# Patient Record
Sex: FEMALE | Race: WHITE | Employment: FULL TIME
[De-identification: names, ages, dates, MRNs, and addresses within clinical notes are randomized per-mention and may not be internally consistent; named-entity substitution may affect disease eponyms.]

---

## 2024-10-06 NOTE — ASSESSMENT & PLAN NOTE
Physical activity for a total of 150 minutes per week is recommended, drink plenty of water.  Reduce CHO intake, such as white pastas, white rice, white breads. Avoid fried foods, and eat more green, leafy vegetables, whole grains, and lean proteins.  Discussed recommended screenings and vaccines, advised to complete fasting labs in December, advised to complete fasting labs, will send results via [employer] but agrees to follow up if abnormal

## 2024-10-06 NOTE — PROGRESS NOTES
Katheryn Taylor is a 29 y.o. female is seen on 10/7/2024 for Annual Exam, Hypothyroidism, and Inattention    Assessment & Plan:     1. Annual physical exam  Assessment & Plan:  Physical activity for a total of 150 minutes per week is recommended, drink plenty of water.  Reduce CHO intake, such as white pastas, white rice, white breads. Avoid fried foods, and eat more green, leafy vegetables, whole grains, and lean proteins.  Discussed recommended screenings and vaccines, advised to complete fasting labs in December, advised to complete fasting labs, will send results via [employer] but agrees to follow up if abnormal  Orders:  -     CBC with Auto Differential; Future  -     Comprehensive Metabolic Panel; Future  -     Lipid Panel; Future  2. Overweight (BMI 25.0-29.9)  Assessment & Plan:  Continue lifestyle modifications  3. Acquired hypothyroidism  Assessment & Plan:  Noted change of dose of Levothyroxine to 88 mcg by OB, will now take over, since she has given birth, continue current dose  30-days supply sent, will send 1 year supply once labs resulted  Orders:  -     TSH; Future  -     T4, Free; Future  -     levothyroxine (SYNTHROID) 88 MCG tablet; Take 1 tablet by mouth Daily, Disp-30 tablet, R-0Needs labs prior to 90-days supplyNormal  4. Inattention  Comments:  Worsening, ASRS positive today, consult psychiatry for formal testing, may follow up here for treatment but will need test results from psych  Orders:  -     Amb External Referral To Psychiatry  5. Flu vaccine need  -     Influenza, FLUCELVAX Trivalent, (age 6 mo+) IM, Preservative Free, 0.5mL     Follow-up and Dispositions    Return in about 1 year (around 10/7/2025) for PHYSICAL.       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

## 2024-10-07 ENCOUNTER — OFFICE VISIT (OUTPATIENT)
Facility: CLINIC | Age: 29
End: 2024-10-07

## 2024-10-07 VITALS
OXYGEN SATURATION: 99 % | DIASTOLIC BLOOD PRESSURE: 76 MMHG | WEIGHT: 147 LBS | HEART RATE: 60 BPM | BODY MASS INDEX: 26.05 KG/M2 | RESPIRATION RATE: 16 BRPM | HEIGHT: 63 IN | SYSTOLIC BLOOD PRESSURE: 117 MMHG | TEMPERATURE: 97 F

## 2024-10-07 DIAGNOSIS — E03.9 ACQUIRED HYPOTHYROIDISM: ICD-10-CM

## 2024-10-07 DIAGNOSIS — Z23 FLU VACCINE NEED: ICD-10-CM

## 2024-10-07 DIAGNOSIS — R41.840 INATTENTION: ICD-10-CM

## 2024-10-07 DIAGNOSIS — E66.3 OVERWEIGHT (BMI 25.0-29.9): ICD-10-CM

## 2024-10-07 DIAGNOSIS — Z00.00 ANNUAL PHYSICAL EXAM: Primary | ICD-10-CM

## 2024-10-07 RX ORDER — NORETHINDRONE 0.35 MG/1
1 TABLET ORAL DAILY
COMMUNITY
Start: 2024-09-30

## 2024-10-07 RX ORDER — LEVOTHYROXINE SODIUM 88 UG/1
88 TABLET ORAL DAILY
Qty: 90 TABLET | Refills: 0 | OUTPATIENT
Start: 2024-10-07

## 2024-10-07 RX ORDER — LEVOTHYROXINE SODIUM 88 UG/1
88 TABLET ORAL DAILY
COMMUNITY
End: 2024-10-07 | Stop reason: SDUPTHER

## 2024-10-07 RX ORDER — LEVOTHYROXINE SODIUM 88 UG/1
88 TABLET ORAL DAILY
Qty: 30 TABLET | Refills: 0 | Status: SHIPPED | OUTPATIENT
Start: 2024-10-07 | End: 2024-10-09 | Stop reason: DRUGHIGH

## 2024-10-07 SDOH — ECONOMIC STABILITY: INCOME INSECURITY: HOW HARD IS IT FOR YOU TO PAY FOR THE VERY BASICS LIKE FOOD, HOUSING, MEDICAL CARE, AND HEATING?: NOT HARD AT ALL

## 2024-10-07 SDOH — ECONOMIC STABILITY: FOOD INSECURITY: WITHIN THE PAST 12 MONTHS, YOU WORRIED THAT YOUR FOOD WOULD RUN OUT BEFORE YOU GOT MONEY TO BUY MORE.: NEVER TRUE

## 2024-10-07 SDOH — ECONOMIC STABILITY: FOOD INSECURITY: WITHIN THE PAST 12 MONTHS, THE FOOD YOU BOUGHT JUST DIDN'T LAST AND YOU DIDN'T HAVE MONEY TO GET MORE.: NEVER TRUE

## 2024-10-07 ASSESSMENT — PATIENT HEALTH QUESTIONNAIRE - PHQ9
SUM OF ALL RESPONSES TO PHQ QUESTIONS 1-9: 0
SUM OF ALL RESPONSES TO PHQ9 QUESTIONS 1 & 2: 0
1. LITTLE INTEREST OR PLEASURE IN DOING THINGS: NOT AT ALL
SUM OF ALL RESPONSES TO PHQ QUESTIONS 1-9: 0
2. FEELING DOWN, DEPRESSED OR HOPELESS: NOT AT ALL
SUM OF ALL RESPONSES TO PHQ QUESTIONS 1-9: 0
SUM OF ALL RESPONSES TO PHQ QUESTIONS 1-9: 0

## 2024-10-07 ASSESSMENT — VISUAL ACUITY
OD_CC: 20/20
OS_CC: 20/25

## 2024-10-07 NOTE — PROGRESS NOTES
Katheryn Taylor presents today for   Chief Complaint   Patient presents with    Annual Exam    Hypothyroidism       Is someone accompanying this pt? no    Is the patient using any DME equipment during OV? no    Depression Screening:      10/7/2024    11:36 AM 6/26/2023     2:47 PM 12/14/2022     3:52 PM 5/9/2022     3:17 PM   PHQ-9 Questionaire   Little interest or pleasure in doing things 0 0 0 0   Feeling down, depressed, or hopeless 0 0 0 0   PHQ-9 Total Score 0 0 0 0        WILMER 7-Anxiety        No data to display                   Learning Assessment:  No question data found.     Fall Risk       No data to display                   Travel Screening:    Travel Screening     No screening recorded since 10/06/24 0000       Travel History   Travel since 09/07/24    No documented travel since 09/07/24            Health Maintenance reviewed and discussed and ordered per Provider.  Transportation Needs: Unknown (10/7/2024)    PRAPARE - Transportation     Lack of Transportation (Medical): Not on file     Lack of Transportation (Non-Medical): No      Food Insecurity: No Food Insecurity (10/7/2024)    Hunger Vital Sign     Worried About Running Out of Food in the Last Year: Never true     Ran Out of Food in the Last Year: Never true     Financial Resource Strain: Low Risk  (10/7/2024)    Overall Financial Resource Strain (CARDIA)     Difficulty of Paying Living Expenses: Not hard at all     Housing Stability: Unknown (10/7/2024)    Housing Stability Vital Sign     Unable to Pay for Housing in the Last Year: Not on file     Number of Times Moved in the Last Year: Not on file     Homeless in the Last Year: No       Did you provide resources if patient requested them? declined      Health Maintenance Due   Topic Date Due    Depression Screen  06/26/2024    Flu vaccine (1) 08/01/2024    COVID-19 Vaccine (3 - 2023-24 season) 09/01/2024   .        \"Have you been to the ER, urgent care clinic since your last visit?

## 2024-10-07 NOTE — PROGRESS NOTES
Katheryn Muniz for   Chief Complaint   Patient presents with    Annual Exam    Hypothyroidism       Obtained signed  Immunization Consent Form. Patient received  Injection of flu administered in right arm Site. Verified by Robyn ARZOLA and verenice ARZOLA that this is the correct immunization/injection. Patient observed for 15 minutes with no adverse reaction.

## 2024-10-07 NOTE — ASSESSMENT & PLAN NOTE
Noted change of dose of Levothyroxine to 88 mcg by OB, will now take over, since she has given birth, continue current dose  30-days supply sent, will send 1 year supply once labs resulted

## 2024-10-08 ENCOUNTER — HOSPITAL ENCOUNTER (OUTPATIENT)
Facility: HOSPITAL | Age: 29
Discharge: HOME | End: 2024-10-11
Payer: OTHER GOVERNMENT

## 2024-10-08 DIAGNOSIS — E03.9 ACQUIRED HYPOTHYROIDISM: ICD-10-CM

## 2024-10-08 DIAGNOSIS — Z00.00 ANNUAL PHYSICAL EXAM: ICD-10-CM

## 2024-10-08 LAB
ALBUMIN SERPL-MCNC: 4.3 G/DL (ref 3.4–5)
ALBUMIN/GLOB SERPL: 1.2 (ref 0.8–1.7)
ALP SERPL-CCNC: 90 U/L (ref 45–117)
ALT SERPL-CCNC: 26 U/L (ref 13–56)
ANION GAP SERPL CALC-SCNC: 4 MMOL/L (ref 3–18)
AST SERPL-CCNC: 15 U/L (ref 10–38)
BASOPHILS # BLD: 0 K/UL (ref 0–0.1)
BASOPHILS NFR BLD: 1 % (ref 0–2)
BILIRUB SERPL-MCNC: 0.5 MG/DL (ref 0.2–1)
BUN SERPL-MCNC: 19 MG/DL (ref 7–18)
BUN/CREAT SERPL: 22 (ref 12–20)
CALCIUM SERPL-MCNC: 9.4 MG/DL (ref 8.5–10.1)
CHLORIDE SERPL-SCNC: 106 MMOL/L (ref 100–111)
CHOLEST SERPL-MCNC: 172 MG/DL
CO2 SERPL-SCNC: 27 MMOL/L (ref 21–32)
CREAT SERPL-MCNC: 0.85 MG/DL (ref 0.6–1.3)
DIFFERENTIAL METHOD BLD: NORMAL
EOSINOPHIL # BLD: 0.1 K/UL (ref 0–0.4)
EOSINOPHIL NFR BLD: 2 % (ref 0–5)
ERYTHROCYTE [DISTWIDTH] IN BLOOD BY AUTOMATED COUNT: 12.4 % (ref 11.6–14.5)
GLOBULIN SER CALC-MCNC: 3.6 G/DL (ref 2–4)
GLUCOSE SERPL-MCNC: 86 MG/DL (ref 74–99)
HCT VFR BLD AUTO: 44.8 % (ref 35–45)
HDLC SERPL-MCNC: 48 MG/DL (ref 40–60)
HDLC SERPL: 3.6 (ref 0–5)
HGB BLD-MCNC: 14.3 G/DL (ref 12–16)
IMM GRANULOCYTES # BLD AUTO: 0 K/UL (ref 0–0.04)
IMM GRANULOCYTES NFR BLD AUTO: 0 % (ref 0–0.5)
LDLC SERPL CALC-MCNC: 100.8 MG/DL (ref 0–100)
LIPID PANEL: ABNORMAL
LYMPHOCYTES # BLD: 1.3 K/UL (ref 0.9–3.6)
LYMPHOCYTES NFR BLD: 28 % (ref 21–52)
MCH RBC QN AUTO: 27.9 PG (ref 24–34)
MCHC RBC AUTO-ENTMCNC: 31.9 G/DL (ref 31–37)
MCV RBC AUTO: 87.5 FL (ref 78–100)
MONOCYTES # BLD: 0.4 K/UL (ref 0.05–1.2)
MONOCYTES NFR BLD: 9 % (ref 3–10)
NEUTS SEG # BLD: 2.8 K/UL (ref 1.8–8)
NEUTS SEG NFR BLD: 60 % (ref 40–73)
NRBC # BLD: 0 K/UL (ref 0–0.01)
NRBC BLD-RTO: 0 PER 100 WBC
PLATELET # BLD AUTO: 242 K/UL (ref 135–420)
PMV BLD AUTO: 11 FL (ref 9.2–11.8)
POTASSIUM SERPL-SCNC: 3.9 MMOL/L (ref 3.5–5.5)
PROT SERPL-MCNC: 7.9 G/DL (ref 6.4–8.2)
RBC # BLD AUTO: 5.12 M/UL (ref 4.2–5.3)
SODIUM SERPL-SCNC: 137 MMOL/L (ref 136–145)
T4 FREE SERPL-MCNC: 1.2 NG/DL (ref 0.7–1.5)
TRIGL SERPL-MCNC: 116 MG/DL
TSH SERPL DL<=0.05 MIU/L-ACNC: 0.07 UIU/ML (ref 0.36–3.74)
VLDLC SERPL CALC-MCNC: 23.2 MG/DL
WBC # BLD AUTO: 4.7 K/UL (ref 4.6–13.2)

## 2024-10-08 PROCEDURE — 36415 COLL VENOUS BLD VENIPUNCTURE: CPT

## 2024-10-08 PROCEDURE — 84439 ASSAY OF FREE THYROXINE: CPT

## 2024-10-08 PROCEDURE — 85025 COMPLETE CBC W/AUTO DIFF WBC: CPT

## 2024-10-08 PROCEDURE — 80061 LIPID PANEL: CPT

## 2024-10-08 PROCEDURE — 80053 COMPREHEN METABOLIC PANEL: CPT

## 2024-10-08 PROCEDURE — 84443 ASSAY THYROID STIM HORMONE: CPT

## 2024-10-09 ENCOUNTER — TELEPHONE (OUTPATIENT)
Facility: CLINIC | Age: 29
End: 2024-10-09

## 2024-10-09 RX ORDER — LEVOTHYROXINE SODIUM 75 UG/1
75 TABLET ORAL DAILY
Qty: 90 TABLET | Refills: 1 | Status: SHIPPED | OUTPATIENT
Start: 2024-10-09

## 2024-10-09 NOTE — TELEPHONE ENCOUNTER
----- Message from MARVIN Schneider sent at 10/9/2024  8:09 AM EDT -----  Thyroid function abnormal, discontinue Levothyroxine 88 mcg and start 75 mcg dose, sent to pharmacy.  We will need to repeat non-fasting labs (TSH/T4 in 3 mos.  Patient to schedule an appointment in 3 mos, labs one week prior.  Thank you.

## 2024-11-05 PROBLEM — Z00.00 ANNUAL PHYSICAL EXAM: Status: RESOLVED | Noted: 2023-06-25 | Resolved: 2024-11-05

## 2025-01-06 ASSESSMENT — PATIENT HEALTH QUESTIONNAIRE - PHQ9
3. TROUBLE FALLING OR STAYING ASLEEP: NOT AT ALL
5. POOR APPETITE OR OVEREATING: NOT AT ALL
7. TROUBLE CONCENTRATING ON THINGS, SUCH AS READING THE NEWSPAPER OR WATCHING TELEVISION: NOT AT ALL
SUM OF ALL RESPONSES TO PHQ QUESTIONS 1-9: 0
4. FEELING TIRED OR HAVING LITTLE ENERGY: NOT AT ALL
7. TROUBLE CONCENTRATING ON THINGS, SUCH AS READING THE NEWSPAPER OR WATCHING TELEVISION: NOT AT ALL
6. FEELING BAD ABOUT YOURSELF - OR THAT YOU ARE A FAILURE OR HAVE LET YOURSELF OR YOUR FAMILY DOWN: NOT AT ALL
2. FEELING DOWN, DEPRESSED OR HOPELESS: NOT AT ALL
10. IF YOU CHECKED OFF ANY PROBLEMS, HOW DIFFICULT HAVE THESE PROBLEMS MADE IT FOR YOU TO DO YOUR WORK, TAKE CARE OF THINGS AT HOME, OR GET ALONG WITH OTHER PEOPLE: NOT DIFFICULT AT ALL
SUM OF ALL RESPONSES TO PHQ QUESTIONS 1-9: 0
6. FEELING BAD ABOUT YOURSELF - OR THAT YOU ARE A FAILURE OR HAVE LET YOURSELF OR YOUR FAMILY DOWN: NOT AT ALL
9. THOUGHTS THAT YOU WOULD BE BETTER OFF DEAD, OR OF HURTING YOURSELF: NOT AT ALL
5. POOR APPETITE OR OVEREATING: NOT AT ALL
SUM OF ALL RESPONSES TO PHQ9 QUESTIONS 1 & 2: 0
SUM OF ALL RESPONSES TO PHQ QUESTIONS 1-9: 0
1. LITTLE INTEREST OR PLEASURE IN DOING THINGS: NOT AT ALL
SUM OF ALL RESPONSES TO PHQ QUESTIONS 1-9: 0
3. TROUBLE FALLING OR STAYING ASLEEP: NOT AT ALL
1. LITTLE INTEREST OR PLEASURE IN DOING THINGS: NOT AT ALL
10. IF YOU CHECKED OFF ANY PROBLEMS, HOW DIFFICULT HAVE THESE PROBLEMS MADE IT FOR YOU TO DO YOUR WORK, TAKE CARE OF THINGS AT HOME, OR GET ALONG WITH OTHER PEOPLE: NOT DIFFICULT AT ALL
8. MOVING OR SPEAKING SO SLOWLY THAT OTHER PEOPLE COULD HAVE NOTICED. OR THE OPPOSITE - BEING SO FIDGETY OR RESTLESS THAT YOU HAVE BEEN MOVING AROUND A LOT MORE THAN USUAL: NOT AT ALL
8. MOVING OR SPEAKING SO SLOWLY THAT OTHER PEOPLE COULD HAVE NOTICED. OR THE OPPOSITE, BEING SO FIGETY OR RESTLESS THAT YOU HAVE BEEN MOVING AROUND A LOT MORE THAN USUAL: NOT AT ALL
SUM OF ALL RESPONSES TO PHQ QUESTIONS 1-9: 0
2. FEELING DOWN, DEPRESSED OR HOPELESS: NOT AT ALL
9. THOUGHTS THAT YOU WOULD BE BETTER OFF DEAD, OR OF HURTING YOURSELF: NOT AT ALL
4. FEELING TIRED OR HAVING LITTLE ENERGY: NOT AT ALL

## 2025-01-06 ASSESSMENT — ANXIETY QUESTIONNAIRES
1. FEELING NERVOUS, ANXIOUS, OR ON EDGE: NOT AT ALL
3. WORRYING TOO MUCH ABOUT DIFFERENT THINGS: NOT AT ALL
IF YOU CHECKED OFF ANY PROBLEMS ON THIS QUESTIONNAIRE, HOW DIFFICULT HAVE THESE PROBLEMS MADE IT FOR YOU TO DO YOUR WORK, TAKE CARE OF THINGS AT HOME, OR GET ALONG WITH OTHER PEOPLE: SOMEWHAT DIFFICULT
3. WORRYING TOO MUCH ABOUT DIFFERENT THINGS: NOT AT ALL
7. FEELING AFRAID AS IF SOMETHING AWFUL MIGHT HAPPEN: NOT AT ALL
4. TROUBLE RELAXING: MORE THAN HALF THE DAYS
4. TROUBLE RELAXING: MORE THAN HALF THE DAYS
5. BEING SO RESTLESS THAT IT IS HARD TO SIT STILL: MORE THAN HALF THE DAYS
2. NOT BEING ABLE TO STOP OR CONTROL WORRYING: NOT AT ALL
6. BECOMING EASILY ANNOYED OR IRRITABLE: SEVERAL DAYS
IF YOU CHECKED OFF ANY PROBLEMS ON THIS QUESTIONNAIRE, HOW DIFFICULT HAVE THESE PROBLEMS MADE IT FOR YOU TO DO YOUR WORK, TAKE CARE OF THINGS AT HOME, OR GET ALONG WITH OTHER PEOPLE: SOMEWHAT DIFFICULT
GAD7 TOTAL SCORE: 5
7. FEELING AFRAID AS IF SOMETHING AWFUL MIGHT HAPPEN: NOT AT ALL
5. BEING SO RESTLESS THAT IT IS HARD TO SIT STILL: MORE THAN HALF THE DAYS
1. FEELING NERVOUS, ANXIOUS, OR ON EDGE: NOT AT ALL
2. NOT BEING ABLE TO STOP OR CONTROL WORRYING: NOT AT ALL
6. BECOMING EASILY ANNOYED OR IRRITABLE: SEVERAL DAYS

## 2025-01-07 ASSESSMENT — ENCOUNTER SYMPTOMS: SHORTNESS OF BREATH: 0

## 2025-01-07 NOTE — PROGRESS NOTES
Chief Complaint   Patient presents with    Hypothyroidism     Assessment & Plan:     1. Acquired hypothyroidism  Assessment & Plan:  TSH over 25, take medication at least 2 hours prior to any other meds  Increase levothyroxine to 88 mcg daily and recheck level in 3 mos  Orders:  -     TSH; Future  -     T4, Free; Future  -     levothyroxine (SYNTHROID) 88 MCG tablet; Take 1 tablet by mouth daily, Disp-90 tablet, R-0Normal      Follow-up and Dispositions    Return in about 3 months (around 4/9/2025) for thyroid disease, lab results (TSH/T4), (virtually).       Subjective:     HPI    Hypothyroidism-  Denies any weight changes, palpitations, constipation/diarrhea, skin/hair changes  Taking medication on empty stomach  Current treatment: Levothyroxine 88 mcg daily  Has not completed repeat TSH/T4      Review of Systems   Respiratory:  Negative for shortness of breath.    Cardiovascular:  Negative for chest pain and leg swelling.   Neurological:  Negative for dizziness, light-headedness and headaches.     Objective:   /75 (Site: Left Upper Arm)   Pulse 54   Temp 98.8 °F (37.1 °C)   Resp 16   Ht 1.6 m (5' 3\")   Wt 67.1 kg (148 lb)   SpO2 95%   BMI 26.22 kg/m²     Physical Exam  Vitals and nursing note reviewed.   Constitutional:       General: She is not in acute distress.     Appearance: She is not ill-appearing.   HENT:      Head: Normocephalic and atraumatic.   Neck:      Thyroid: No thyroid mass, thyromegaly or thyroid tenderness.   Cardiovascular:      Rate and Rhythm: Normal rate and regular rhythm.   Pulmonary:      Effort: Pulmonary effort is normal. No respiratory distress.      Breath sounds: No wheezing, rhonchi or rales.   Musculoskeletal:         General: Normal range of motion.   Skin:     General: Skin is warm and dry.   Neurological:      General: No focal deficit present.      Mental Status: She is alert.   Psychiatric:         Mood and Affect: Mood normal.         Thought Content: Thought

## 2025-01-08 ENCOUNTER — HOSPITAL ENCOUNTER (OUTPATIENT)
Facility: HOSPITAL | Age: 30
Discharge: HOME | End: 2025-01-11
Payer: OTHER GOVERNMENT

## 2025-01-08 DIAGNOSIS — E03.9 ACQUIRED HYPOTHYROIDISM: ICD-10-CM

## 2025-01-08 LAB
T4 FREE SERPL-MCNC: 0.9 NG/DL (ref 0.7–1.5)
TSH SERPL DL<=0.05 MIU/L-ACNC: 25.9 UIU/ML (ref 0.36–3.74)

## 2025-01-08 PROCEDURE — 84443 ASSAY THYROID STIM HORMONE: CPT

## 2025-01-08 PROCEDURE — 36415 COLL VENOUS BLD VENIPUNCTURE: CPT

## 2025-01-08 PROCEDURE — 84439 ASSAY OF FREE THYROXINE: CPT

## 2025-01-09 ENCOUNTER — OFFICE VISIT (OUTPATIENT)
Facility: CLINIC | Age: 30
End: 2025-01-09
Payer: OTHER GOVERNMENT

## 2025-01-09 VITALS
HEIGHT: 63 IN | RESPIRATION RATE: 16 BRPM | WEIGHT: 148 LBS | HEART RATE: 54 BPM | DIASTOLIC BLOOD PRESSURE: 75 MMHG | OXYGEN SATURATION: 95 % | SYSTOLIC BLOOD PRESSURE: 117 MMHG | TEMPERATURE: 98.8 F | BODY MASS INDEX: 26.22 KG/M2

## 2025-01-09 DIAGNOSIS — E03.9 ACQUIRED HYPOTHYROIDISM: Primary | ICD-10-CM

## 2025-01-09 PROCEDURE — 99214 OFFICE O/P EST MOD 30 MIN: CPT | Performed by: NURSE PRACTITIONER

## 2025-01-09 RX ORDER — LEVOTHYROXINE SODIUM 88 UG/1
88 TABLET ORAL DAILY
Qty: 90 TABLET | Refills: 0 | Status: SHIPPED | OUTPATIENT
Start: 2025-01-09

## 2025-01-09 SDOH — ECONOMIC STABILITY: FOOD INSECURITY: WITHIN THE PAST 12 MONTHS, THE FOOD YOU BOUGHT JUST DIDN'T LAST AND YOU DIDN'T HAVE MONEY TO GET MORE.: NEVER TRUE

## 2025-01-09 SDOH — ECONOMIC STABILITY: FOOD INSECURITY: WITHIN THE PAST 12 MONTHS, YOU WORRIED THAT YOUR FOOD WOULD RUN OUT BEFORE YOU GOT MONEY TO BUY MORE.: NEVER TRUE

## 2025-01-09 NOTE — ASSESSMENT & PLAN NOTE
TSH over 25, take medication at least 2 hours prior to any other meds  Increase levothyroxine to 88 mcg daily and recheck level in 3 mos

## 2025-01-09 NOTE — PROGRESS NOTES
Katheryn Taylor presents today for   Chief Complaint   Patient presents with    Hypothyroidism       Is someone accompanying this pt? no    Is the patient using any DME equipment during OV? no    Depression Screenin/6/2025     8:24 AM 10/7/2024    11:36 AM 2023     2:47 PM 2022     3:52 PM 2022     3:17 PM   PHQ-9 Questionaire   Little interest or pleasure in doing things 0 0 0 0 0   Feeling down, depressed, or hopeless 0 0 0 0 0   Trouble falling or staying asleep, or sleeping too much 0       Feeling tired or having little energy 0       Poor appetite or overeating 0       Feeling bad about yourself - or that you are a failure or have let yourself or your family down 0       Trouble concentrating on things, such as reading the newspaper or watching television 0       Moving or speaking so slowly that other people could have noticed. Or the opposite - being so fidgety or restless that you have been moving around a lot more than usual 0       Thoughts that you would be better off dead, or of hurting yourself in some way 0       PHQ-9 Total Score 0 0 0 0 0   If you checked off any problems, how difficult have these problems made it for you to do your work, take care of things at home, or get along with other people? 0            WILMER 7-Anxiety       2025     8:20 AM   WILMER-7 SCREENING   Feeling nervous, anxious, or on edge Not at all   Not being able to stop or control worrying Not at all   Worrying too much about different things Not at all   Trouble relaxing More than half the days   Being so restless that it is hard to sit still More than half the days   Becoming easily annoyed or irritable Several days   Feeling afraid as if something awful might happen Not at all   WILMER-7 Total Score 5   How difficult have these problems made it for you to do your work, take care of things at home, or get along with other people? Somewhat difficult          Learning Assessment:  No question data found.

## 2025-01-14 ENCOUNTER — TELEPHONE (OUTPATIENT)
Facility: CLINIC | Age: 30
End: 2025-01-14

## 2025-01-14 DIAGNOSIS — R41.840 INATTENTION: Primary | ICD-10-CM

## 2025-01-14 NOTE — TELEPHONE ENCOUNTER
Patient called in stating that she needs an new referral to [facility] in [place]. She states that the place she was originally referred to doesn't to the testing she needs and she had an appointment today with [facility] and they are requesting a referral. Please advise.

## 2025-03-22 ENCOUNTER — PATIENT MESSAGE (OUTPATIENT)
Facility: CLINIC | Age: 30
End: 2025-03-22

## 2025-04-06 ENCOUNTER — TELEPHONE (OUTPATIENT)
Facility: CLINIC | Age: 30
End: 2025-04-06

## 2025-04-07 ENCOUNTER — RESULTS FOLLOW-UP (OUTPATIENT)
Facility: CLINIC | Age: 30
End: 2025-04-07

## 2025-04-07 ENCOUNTER — HOSPITAL ENCOUNTER (OUTPATIENT)
Facility: HOSPITAL | Age: 30
Discharge: HOME | End: 2025-04-10
Payer: OTHER GOVERNMENT

## 2025-04-07 DIAGNOSIS — E03.9 ACQUIRED HYPOTHYROIDISM: ICD-10-CM

## 2025-04-07 PROBLEM — F41.9 ANXIETY: Status: RESOLVED | Noted: 2022-04-29 | Resolved: 2025-04-07

## 2025-04-07 LAB
T4 FREE SERPL-MCNC: 0.9 NG/DL (ref 0.7–1.5)
TSH SERPL DL<=0.05 MIU/L-ACNC: 5.23 UIU/ML (ref 0.36–3.74)

## 2025-04-07 PROCEDURE — 84439 ASSAY OF FREE THYROXINE: CPT

## 2025-04-07 PROCEDURE — 84443 ASSAY THYROID STIM HORMONE: CPT

## 2025-04-07 PROCEDURE — 36415 COLL VENOUS BLD VENIPUNCTURE: CPT

## 2025-04-09 ENCOUNTER — TELEPHONE (OUTPATIENT)
Facility: CLINIC | Age: 30
End: 2025-04-09

## 2025-04-09 NOTE — TELEPHONE ENCOUNTER
Mychart is currently down and we do not call to discuss results over the phone. Patient will need to reschedule appointment to thoroughly discuss labs with provider, either in person or future virtual visit.

## 2025-04-09 NOTE — TELEPHONE ENCOUNTER
Patient was originally scheduled for vv today and was unable to connect, attempted to rescheduled patient but times and dates didn't work. She is wanting to know if someone could call her to  discuss her abnormal lab results.

## 2025-04-15 DIAGNOSIS — E03.9 ACQUIRED HYPOTHYROIDISM: ICD-10-CM

## 2025-04-15 RX ORDER — LEVOTHYROXINE SODIUM 88 UG/1
88 TABLET ORAL DAILY
Qty: 90 TABLET | Refills: 0 | Status: SHIPPED | OUTPATIENT
Start: 2025-04-15

## 2025-04-15 NOTE — TELEPHONE ENCOUNTER
Labs:   Lab Results   Component Value Date     10/08/2024    K 3.9 10/08/2024     10/08/2024    CO2 27 10/08/2024    BUN 19 (H) 10/08/2024    CREATININE 0.85 10/08/2024    GLUCOSE 86 10/08/2024    CALCIUM 9.4 10/08/2024    BILITOT 0.5 10/08/2024    ALKPHOS 90 10/08/2024    AST 15 10/08/2024    ALT 26 10/08/2024    LABGLOM >90 10/08/2024    GFRAA >60 05/04/2022    AGRATIO 1.1 05/04/2022    GLOB 3.6 10/08/2024        Additional Notes:

## 2025-04-27 NOTE — PROGRESS NOTES
No chief complaint on file.      Assessment & Plan:     Assessment & Plan  Acquired hypothyroidism  TSH significantly improved but remains elevated  Increase levothyroxine to 100 mcg and recheck levels in 3 mos         Encounter to discuss test results  Discussed 04/07/2025 labs           3 mos for thyroid disease, lab results    Subjective:     History of Present Illness        Objective:     There were no vitals taken for this visit.    Physical Exam  Vitals and nursing note reviewed.   Constitutional:       General: She is not in acute distress.     Appearance: She is not ill-appearing.   HENT:      Head: Normocephalic and atraumatic.   Cardiovascular:      Rate and Rhythm: Normal rate and regular rhythm.   Pulmonary:      Effort: Pulmonary effort is normal. No respiratory distress.      Breath sounds: No wheezing, rhonchi or rales.   Musculoskeletal:         General: Normal range of motion.   Skin:     General: Skin is warm and dry.   Neurological:      General: No focal deficit present.      Mental Status: She is alert.   Psychiatric:         Mood and Affect: Mood normal.         Thought Content: Thought content normal.         Judgment: Judgment normal.         BRIANNA Schneider-C

## 2025-04-27 NOTE — ASSESSMENT & PLAN NOTE
TSH level significantly improved, but remains slightly elevated  Taking medication correctly, increase levothyroxine to 100  mcg daily  Requesting referral to endocrine for management, given family hx of thyroid disease  Defer recheck to specialist but if there are delays, will recheck TSH/T4 in 3 mos    Orders:    levothyroxine (SYNTHROID) 100 MCG tablet; Take 1 tablet by mouth daily    Amb External Referral To Endocrinology    
TSH significantly improved but remains elevated  Increase levothyroxine to 100 mcg and recheck levels in 3 mos         
See provider note

## 2025-04-27 NOTE — PROGRESS NOTES
Katheryn Taylor is a 29 y.o. female who was seen by synchronous (real-time) audio-video technology on 4/29/2025 for Thyroid Problem and Discuss Labs    Assessment & Plan:     Assessment & Plan  Acquired hypothyroidism  TSH level significantly improved, but remains slightly elevated  Taking medication correctly, increase levothyroxine to 100  mcg daily  Requesting referral to endocrine for management, given family hx of thyroid disease  Defer recheck to specialist but if there are delays, will recheck TSH/T4 in 3 mos    Orders:    levothyroxine (SYNTHROID) 100 MCG tablet; Take 1 tablet by mouth daily    Amb External Referral To Endocrinology    Encounter to discuss test results  04/07/2025 thyroid labs reviewed           Follow-up and Dispositions    Return in about 6 months (around 10/29/2025) for PHYSICAL.       SUBJECTIVE:     HPI    Patient is in the [facility]: Yes    History of Present Illness  The patient presents via virtual visit for evaluation of thyroid levels.    Thyroid levels  - Improved from 25.90 in 01/2025 to 5.23 on 04/07/2025  - She is on 88 mcg of medication, taken on an empty stomach with no other medications within 2 hours  - No gastrointestinal disturbances or palpitations reported    Family history  - Thyroid issues noted in mother and sister  - Mother experienced early menopause at 40 due to thyroid condition  - Patient seeks specialist care due to family history    Supplemental information: Covered under  Select.    FAMILY HISTORY  - Mother and sister have thyroid issues  - Mother experienced early menopause at age 40 due to thyroid condition        OBJECTIVE:     Physical Exam     Constitutional: Stable-appearing, in no distress, alert and oriented  HENT:   Head: Normocephalic and atraumatic.   Ears:  Hearing grossly intact.  Mouth:  No visible perioral lesions, cyanosis, or lip swelling.  Pulmonary/Chest: Does not appear dyspneic, no audible wheezes or nasal

## 2025-04-29 ENCOUNTER — TELEMEDICINE (OUTPATIENT)
Facility: CLINIC | Age: 30
End: 2025-04-29
Payer: OTHER GOVERNMENT

## 2025-04-29 ENCOUNTER — TELEPHONE (OUTPATIENT)
Facility: CLINIC | Age: 30
End: 2025-04-29

## 2025-04-29 DIAGNOSIS — Z71.2 ENCOUNTER TO DISCUSS TEST RESULTS: ICD-10-CM

## 2025-04-29 DIAGNOSIS — E03.9 ACQUIRED HYPOTHYROIDISM: Primary | ICD-10-CM

## 2025-04-29 PROCEDURE — 99214 OFFICE O/P EST MOD 30 MIN: CPT | Performed by: NURSE PRACTITIONER

## 2025-04-29 RX ORDER — LEVOTHYROXINE SODIUM 100 UG/1
100 TABLET ORAL DAILY
Qty: 90 TABLET | Refills: 0 | Status: SHIPPED | OUTPATIENT
Start: 2025-04-29

## 2025-04-29 RX ORDER — DROSPIRENONE 4 MG/1
4 TABLET, FILM COATED ORAL DAILY
COMMUNITY

## 2025-04-29 NOTE — PROGRESS NOTES
Katheryn Taylor presents today for   Chief Complaint   Patient presents with    Thyroid Problem    Discuss Labs         Is the patient in the [facility] ? yes    Is someone accompanying this pt? N/a    Is the patient using any DME equipment during OV? N/a    Depression Screenin/6/2025     8:24 AM 10/7/2024    11:36 AM 2023     2:47 PM 2022     3:52 PM 2022     3:17 PM   PHQ-9 Questionaire   Little interest or pleasure in doing things 0 0 0 0 0   Feeling down, depressed, or hopeless 0 0 0 0 0   Trouble falling or staying asleep, or sleeping too much 0       Feeling tired or having little energy 0       Poor appetite or overeating 0       Feeling bad about yourself - or that you are a failure or have let yourself or your family down 0       Trouble concentrating on things, such as reading the newspaper or watching television 0       Moving or speaking so slowly that other people could have noticed. Or the opposite - being so fidgety or restless that you have been moving around a lot more than usual 0       Thoughts that you would be better off dead, or of hurting yourself in some way 0       PHQ-9 Total Score 0  0 0 0 0   If you checked off any problems, how difficult have these problems made it for you to do your work, take care of things at home, or get along with other people? 0           Patient-reported        WILMER 7-Anxiety       2025     8:20 AM   WILMER-7 SCREENING   Feeling nervous, anxious, or on edge Not at all   Not being able to stop or control worrying Not at all   Worrying too much about different things Not at all   Trouble relaxing More than half the days   Being so restless that it is hard to sit still More than half the days   Becoming easily annoyed or irritable Several days   Feeling afraid as if something awful might happen Not at all   WILMER-7 Total Score 5    How difficult have these problems made it for you to do your work, take care of things at home, or get

## 2025-06-24 ENCOUNTER — TRANSCRIBE ORDERS (OUTPATIENT)
Age: 30
End: 2025-06-24

## 2025-06-24 ENCOUNTER — HOSPITAL ENCOUNTER (OUTPATIENT)
Age: 30
Discharge: HOME | End: 2025-06-24
Payer: OTHER GOVERNMENT

## 2025-06-24 DIAGNOSIS — E03.9 ACQUIRED HYPOTHYROIDISM: Primary | ICD-10-CM

## 2025-06-24 LAB
T4 FREE SERPL-MCNC: 1.4 NG/DL (ref 0.9–1.7)
TSH, 3RD GENERATION: 4.32 UIU/ML (ref 0.27–4.2)

## 2025-06-24 PROCEDURE — 84443 ASSAY THYROID STIM HORMONE: CPT

## 2025-06-24 PROCEDURE — 84439 ASSAY OF FREE THYROXINE: CPT

## 2025-06-24 PROCEDURE — 36415 COLL VENOUS BLD VENIPUNCTURE: CPT
